# Patient Record
(demographics unavailable — no encounter records)

---

## 2024-10-21 NOTE — ASSESSMENT
[FreeTextEntry1] : ANNA FERGUSON is a 71-year-old male, with a history of CAD, who presents for consultation for BPH, bothersome lower urinary tract symptoms and erectile dysfunction.  Voiding diary suggest behavioral contribution to lower urinary tract symptoms especially nocturia; erectile dysfunction perhaps also impacted by patient's medications  Patient would prefer watchful waiting for chronic BPH with stable LUTS as he would prefer to avoid a daily medication for now.  In the future can consider other options. Cont decrease bladder irritants He will focus on his chronic erectile dysfunction - change daily Cialis 5mg to Cialis 10 mg as needed. -Patient can always consider discussing with his psychiatrist changing regimen if necessary -Follow-up in 1 year with PSA prior or earlier if he has any side effects or worsening symptoms - recommended to report if he has any bothering urinary symptoms prior to the appointment

## 2024-10-21 NOTE — HISTORY OF PRESENT ILLNESS
[FreeTextEntry1] : ANNA FERGUSON is a 71-year-old male, with a history of CAD, who presents for consultation for BPH, bothersome lower urinary tract symptoms and erectile dysfunction.  Voiding diaries suggest behavioral contribution to lower urinary tract symptoms especially nocturia; erectile dysfunction perhaps also impacted by patient's medications,  in the last visit patient started daily Cialis.  Pt presents today with his wife. He reports urinary urgency throughout the day and nocturia 3x. Decent flow.  States he took Cialis 5 mg as needed as opposed to daily.  Had improvement with erections though states he could use more rigidity. Denies flank pain, gross hematuria, dysuria or associated symptoms.   previously Voiding diary from 2024 shows no nocturnal polyuria, intermittent frequency, caffeine intake in the morning, 16 oz of water prior to bedtime.  Uroflow today, tracings visualized by me shows Qmax 8.6 ml/s, weakened voiding pattern, voided volume 70 ml.  PVR - 19 cc  RBUS 04/09/2024  No intravesical protrusion.  IMPRESSION: Enlarged prostate at 55 g. No urinary retention. Normal kidneys.  (There is a 1.3 cm simple right interpilar renal cortical cysts, w/o appreciable change.)  UA 03/05/2024 - negative UCx - normal urogenital katrina  UCyt - NEGATIVE FOR HIGH GRADE UROTHELIAL CARCINOMA. Benign urothelial cells. Crystals and rare red blood cells.  Denies  PMH including previous kidney stones, recurrent UTIs. Family History: No  malignancies Social History: Denies any former/current cigarette smoking, or illicit drug use.  Drinks alcohol 3-4 times a week.  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   Old records reviewed PSA, from 1/22/2024, is 0.3 Urinalysis negative for RBCs Hemoglobin A1c 5.7 CMP demonstrates creatinine 0.76 with an EGFR of 96 Liver function testing within normal limits

## 2024-10-21 NOTE — ADDENDUM
[FreeTextEntry1] : Patient's note was transcribed with the assistance of a medical scribe under the supervision of Dr. Jackson. I, Dr. Jackson, have reviewed the patient's chart and agree that it aligns with my medical decisions. May Villegas, our scribe, also served as a chaperone for physical examination purposes.